# Patient Record
Sex: FEMALE | Race: WHITE
[De-identification: names, ages, dates, MRNs, and addresses within clinical notes are randomized per-mention and may not be internally consistent; named-entity substitution may affect disease eponyms.]

---

## 2020-01-31 ENCOUNTER — HOSPITAL ENCOUNTER (EMERGENCY)
Dept: HOSPITAL 76 - ED | Age: 65
Discharge: ANOTHER HOSPITAL | End: 2020-01-31
Payer: COMMERCIAL

## 2020-01-31 ENCOUNTER — HOSPITAL ENCOUNTER (OUTPATIENT)
Dept: HOSPITAL 76 - EMS | Age: 65
Discharge: ANOTHER HOSPITAL | End: 2020-01-31
Attending: SURGERY
Payer: COMMERCIAL

## 2020-01-31 VITALS — DIASTOLIC BLOOD PRESSURE: 78 MMHG | SYSTOLIC BLOOD PRESSURE: 155 MMHG

## 2020-01-31 DIAGNOSIS — R07.2: ICD-10-CM

## 2020-01-31 DIAGNOSIS — I21.4: Primary | ICD-10-CM

## 2020-01-31 DIAGNOSIS — R07.9: Primary | ICD-10-CM

## 2020-01-31 LAB
ALBUMIN DIAFP-MCNC: 4.2 G/DL (ref 3.2–5.5)
ALBUMIN/GLOB SERPL: 1.1 {RATIO} (ref 1–2.2)
ALP SERPL-CCNC: 74 IU/L (ref 42–121)
ALT SERPL W P-5'-P-CCNC: 22 IU/L (ref 10–60)
ANION GAP SERPL CALCULATED.4IONS-SCNC: 14 MMOL/L (ref 6–13)
AST SERPL W P-5'-P-CCNC: 17 IU/L (ref 10–42)
BASOPHILS NFR BLD AUTO: 0.1 10^3/UL (ref 0–0.1)
BASOPHILS NFR BLD AUTO: 0.7 %
BILIRUB BLD-MCNC: 0.7 MG/DL (ref 0.2–1)
BUN SERPL-MCNC: 16 MG/DL (ref 6–20)
CALCIUM UR-MCNC: 9.1 MG/DL (ref 8.5–10.3)
CHLORIDE SERPL-SCNC: 99 MMOL/L (ref 101–111)
CO2 SERPL-SCNC: 26 MMOL/L (ref 21–32)
CREAT SERPLBLD-SCNC: 0.9 MG/DL (ref 0.4–1)
EOSINOPHIL # BLD AUTO: 0.3 10^3/UL (ref 0–0.7)
EOSINOPHIL NFR BLD AUTO: 2.2 %
ERYTHROCYTE [DISTWIDTH] IN BLOOD BY AUTOMATED COUNT: 15.9 % (ref 12–15)
GFRSERPLBLD MDRD-ARVRAT: 63 ML/MIN/{1.73_M2} (ref 89–?)
GLOBULIN SER-MCNC: 3.7 G/DL (ref 2.1–4.2)
GLUCOSE SERPL-MCNC: 114 MG/DL (ref 70–100)
HGB UR QL STRIP: 14.6 G/DL (ref 12–16)
LIPASE SERPL-CCNC: 30 U/L (ref 22–51)
LYMPHOCYTES # SPEC AUTO: 2 10^3/UL (ref 1.5–3.5)
LYMPHOCYTES NFR BLD AUTO: 16.8 %
MCH RBC QN AUTO: 27.6 PG (ref 27–31)
MCHC RBC AUTO-ENTMCNC: 32 G/DL (ref 32–36)
MCV RBC AUTO: 86.2 FL (ref 81–99)
MONOCYTES # BLD AUTO: 0.6 10^3/UL (ref 0–1)
MONOCYTES NFR BLD AUTO: 5.3 %
NEUTROPHILS # BLD AUTO: 8.9 10^3/UL (ref 1.5–6.6)
NEUTROPHILS # SNV AUTO: 11.9 X10^3/UL (ref 4.8–10.8)
NEUTROPHILS NFR BLD AUTO: 74.4 %
PDW BLD AUTO: 10.1 FL (ref 7.9–10.8)
PLATELET # BLD: 372 10^3/UL (ref 130–450)
PROT SPEC-MCNC: 7.9 G/DL (ref 6.7–8.2)
RBC MAR: 5.29 10^6/UL (ref 4.2–5.4)
SODIUM SERPLBLD-SCNC: 139 MMOL/L (ref 135–145)

## 2020-01-31 PROCEDURE — 99285 EMERGENCY DEPT VISIT HI MDM: CPT

## 2020-01-31 PROCEDURE — 85379 FIBRIN DEGRADATION QUANT: CPT

## 2020-01-31 PROCEDURE — 36415 COLL VENOUS BLD VENIPUNCTURE: CPT

## 2020-01-31 PROCEDURE — 99284 EMERGENCY DEPT VISIT MOD MDM: CPT

## 2020-01-31 PROCEDURE — 93005 ELECTROCARDIOGRAM TRACING: CPT

## 2020-01-31 PROCEDURE — 96375 TX/PRO/DX INJ NEW DRUG ADDON: CPT

## 2020-01-31 PROCEDURE — 83735 ASSAY OF MAGNESIUM: CPT

## 2020-01-31 PROCEDURE — 96374 THER/PROPH/DIAG INJ IV PUSH: CPT

## 2020-01-31 PROCEDURE — 84484 ASSAY OF TROPONIN QUANT: CPT

## 2020-01-31 PROCEDURE — 71045 X-RAY EXAM CHEST 1 VIEW: CPT

## 2020-01-31 PROCEDURE — 83880 ASSAY OF NATRIURETIC PEPTIDE: CPT

## 2020-01-31 PROCEDURE — 85025 COMPLETE CBC W/AUTO DIFF WBC: CPT

## 2020-01-31 PROCEDURE — 83690 ASSAY OF LIPASE: CPT

## 2020-01-31 PROCEDURE — 80053 COMPREHEN METABOLIC PANEL: CPT

## 2020-01-31 NOTE — XRAY REPORT
Reason:  Chest pain

Procedure Date:  01/31/2020   

Accession Number:  [id number] / [id number]                    

Procedure:  XR  - Chest 1 View X-Ray CPT Code:  99206

 

***Final Report***

 

 

FULL RESULT:

 

 

EXAM:

CHEST RADIOGRAPHY 1 VIEW

 

EXAM DATE: 1/31/2020.

 

CLINICAL HISTORY: Chest pain.

 

COMPARISON: None.

 

TECHNIQUE: AP upright portable chest at 1051.

 

FINDINGS:

Lungs/Pleura: Normal vasculature. The lungs are clear. No pleural fluid 

or pneumothorax.

 

Mediastinum: Normal cardiac and mediastinal contours.

 

Bones: Mild degenerative changes of the spine.

IMPRESSION: Normal AP upright portable chest.

 

RADIA

## 2020-01-31 NOTE — ED PHYSICIAN DOCUMENTATION
PD HPI CHEST PAIN





- Stated complaint


Stated Complaint: CP





- Chief complaint


Chief Complaint: Cardiac





- History obtained from


History obtained from: Patient





- History of Present Illness


Timing - onset: How many weeks ago (Onset of chest pain 2 weeks ago which came 

on abruptly at that time and was only with light exertion.  It continued for an 

hour or 2 and then decreased over a few hours.  Since that time it has been 

intermittent with exertion mainly and light activity but the last several days 

has been even at rest.  This morning she had an onset of the pain without really

any exertion and it continued through being seen in the office.  She was given a

nitroglycerin which did relieve the pain.  She had an EKG which shows some 

inverted T waves but no ST changes.  Referred to the hospital for further 

testing.)


Timing - onset during: Rest, Light activity


Timing - details: Intermittant (initially, but more consistent and at rest the 

past couple of days.)


Quality: Aching, Dull, Pain.  No: Pressure


Location: Substernal, Left chest


Radiation: Neck, Back


Improved by: Nitro (given by PMD in office PTA).  No: Rest


Associated symptoms: Shortness of air, Feeling faint / dizzy.  No: Palpitations,

Cough


Similar symptoms before: Has not had sx before





Review of Systems


Constitutional: denies: Fever, Chills


Nose: reports: Rhinorrhea / runny nose (regularly due to allergies).  denies: 

Congestion


Throat: denies: Sore throat


Respiratory: denies: Cough


GI: denies: Abdominal Pain, Nausea, Vomiting, Diarrhea


Skin: denies: Rash, Lesions


Neurologic: reports: Generalized weakness.  denies: Focal weakness, Numbness, 

Near syncope





PD PAST MEDICAL HISTORY





- Past Medical History


Past Medical History: No


Cardiovascular: None


Respiratory: None


Neuro: None


Endocrine/Autoimmune: None


GI: None


GYN: None


: None


HEENT: None


Psych: None


Musculoskeletal: None


Derm: None





- Past Surgical History


Past Surgical History: No





- Allergies


Allergies/Adverse Reactions: 


                                    Allergies











Allergy/AdvReac Type Severity Reaction Status Date / Time


 


No Known Drug Allergies Allergy   Verified 01/31/20 10:41














- Social History


Does the pt smoke?: No


Smoking Status: Never smoker


Does the pt drink ETOH?: No





- Immunizations


Immunizations are current?: Yes





- POLST


Patient has POLST: No





PD ED PE NORMAL





- Vitals


Vital signs reviewed: Yes





- General


General: Alert and oriented X 3, No acute distress, Well developed/nourished





- HEENT


HEENT: Moist mucous membranes, Pharynx benign





- Neck


Neck: Supple, no meningeal sign, No adenopathy, No JVD





- Cardiac


Cardiac: RRR, No murmur





- Respiratory


Respiratory: No respiratory distress, Clear bilaterally





- Abdomen


Abdomen: Soft, Non tender, No organomegaly





- Female 


Female : Deferred





- Rectal


Rectal: Deferred





- Back


Back: No CVA TTP





- Derm


Derm: Normal color, Warm and dry





- Extremities


Extremities: No tenderness to palpate, Normal ROM s pain, No edema, No calf 

tenderness / cord





- Neuro


Neuro: Alert and oriented X 3, No motor deficit, Normal speech





Results





- Vitals


Vitals: 


                               Vital Signs - 24 hr











  01/31/20 01/31/20 01/31/20





  10:39 11:56 12:00


 


Temperature 36.0 C L  


 


Heart Rate 96 97 81


 


Respiratory 16 14 15





Rate   


 


Blood Pressure 162/86 H 152/92 H 153/94 H


 


O2 Saturation 98 98 98














  01/31/20 01/31/20 01/31/20





  12:05 12:10 12:31


 


Temperature   


 


Heart Rate 77 76 77


 


Respiratory 20 22 16





Rate   


 


Blood Pressure 141/92 H 149/86 H 147/84 H


 


O2 Saturation 96 97 95














  01/31/20 01/31/20





  14:00 14:56


 


Temperature 36.9 C 


 


Heart Rate 90 85


 


Respiratory 19 24





Rate  


 


Blood Pressure 157/87 H 166/91 H


 


O2 Saturation 97 98








                                     Oxygen











O2 Source                      Room air

















- EKG (time done)


  ** 10:41


Rate: Rate (enter#) (94)


Rhythm: NSR


Axis: Normal


Intervals: Normal UT


QRS: Normal


Ischemia: Normal ST segments, T wave inversion (anterolaterally).  No: ST 

elevation c/w ischemia, ST depression





- Labs


Labs: 


                                Laboratory Tests











  01/31/20 01/31/20 01/31/20





  10:55 10:55 10:55


 


WBC  11.9 H  


 


RBC  5.29  


 


Hgb  14.6  


 


Hct  45.6  


 


MCV  86.2  


 


MCH  27.6  


 


MCHC  32.0  


 


RDW  15.9 H  


 


Plt Count  372  


 


MPV  10.1  


 


Neut # (Auto)  8.9 H  


 


Lymph # (Auto)  2.0  


 


Mono # (Auto)  0.6  


 


Eos # (Auto)  0.3  


 


Baso # (Auto)  0.1  


 


Absolute Nucleated RBC  0.00  


 


Nucleated RBC %  0.0  


 


D-Dimer   


 


Sodium   139 


 


Potassium   3.7 


 


Chloride   99 L 


 


Carbon Dioxide   26 


 


Anion Gap   14.0 H 


 


BUN   16 


 


Creatinine   0.9 


 


Estimated GFR (MDRD)   63 L 


 


Glucose   114 H 


 


Calcium   9.1 


 


Magnesium   


 


Total Bilirubin   0.7 


 


AST   17 


 


ALT   22 


 


Alkaline Phosphatase   74 


 


Troponin I High Sens    111.1 H*


 


B-Natriuretic Peptide   


 


Total Protein   7.9 


 


Albumin   4.2 


 


Globulin   3.7 


 


Albumin/Globulin Ratio   1.1 


 


Lipase   30 














  01/31/20 01/31/20 01/31/20





  12:02 12:02 12:02


 


WBC   


 


RBC   


 


Hgb   


 


Hct   


 


MCV   


 


MCH   


 


MCHC   


 


RDW   


 


Plt Count   


 


MPV   


 


Neut # (Auto)   


 


Lymph # (Auto)   


 


Mono # (Auto)   


 


Eos # (Auto)   


 


Baso # (Auto)   


 


Absolute Nucleated RBC   


 


Nucleated RBC %   


 


D-Dimer  < 200.0 L  


 


Sodium   


 


Potassium   


 


Chloride   


 


Carbon Dioxide   


 


Anion Gap   


 


BUN   


 


Creatinine   


 


Estimated GFR (MDRD)   


 


Glucose   


 


Calcium   


 


Magnesium    2.1


 


Total Bilirubin   


 


AST   


 


ALT   


 


Alkaline Phosphatase   


 


Troponin I High Sens   


 


B-Natriuretic Peptide   120 H 


 


Total Protein   


 


Albumin   


 


Globulin   


 


Albumin/Globulin Ratio   


 


Lipase   














  01/31/20





  12:58


 


WBC 


 


RBC 


 


Hgb 


 


Hct 


 


MCV 


 


MCH 


 


MCHC 


 


RDW 


 


Plt Count 


 


MPV 


 


Neut # (Auto) 


 


Lymph # (Auto) 


 


Mono # (Auto) 


 


Eos # (Auto) 


 


Baso # (Auto) 


 


Absolute Nucleated RBC 


 


Nucleated RBC % 


 


D-Dimer 


 


Sodium 


 


Potassium 


 


Chloride 


 


Carbon Dioxide 


 


Anion Gap 


 


BUN 


 


Creatinine 


 


Estimated GFR (MDRD) 


 


Glucose 


 


Calcium 


 


Magnesium 


 


Total Bilirubin 


 


AST 


 


ALT 


 


Alkaline Phosphatase 


 


Troponin I High Sens  121.0 H*


 


B-Natriuretic Peptide 


 


Total Protein 


 


Albumin 


 


Globulin 


 


Albumin/Globulin Ratio 


 


Lipase 














PD MEDICAL DECISION MAKING





- ED course


Complexity details: reviewed results (EKG with some T wave inversions but no ST 

elevations.  Her troponin is moderately elevated suggesting myocardial injury.  

A repeat at 2 hours showed a very slight increase from 1 11-1 21.  Her d-dimer 

was negative excluding PE.  Her BNP was not elevated.  At this point would be 

concern for unstable angina versus non-STEMI and she needs a higher degree of 

heart testing then we would provide here at our facility.  I talked with 

[facility] cardiology initially and then the hospitalist.  They

are accepting of transfer.), re-evaluated patient, considered differential 

(Concern for MI versus unstable angina.  Given the onset couple of weeks ago 

quickly at that point, could also consider PE or lung related.  Will check lab 

tests as well as EKG.), d/w patient





Departure





- Departure


Disposition: 02 Transfer Acute Care Hosp


Clinical Impression: 


 Elevated troponin, Non-STEMI (non-ST elevated myocardial infarction)





Chest pain


Qualifiers:


 Chest pain type: precordial pain Qualified Code(s): R07.2 - Precordial pain





Condition: Stable


Record reviewed to determine appropriate education?: Yes

## 2020-02-11 ENCOUNTER — HOSPITAL ENCOUNTER (OUTPATIENT)
Dept: HOSPITAL 76 - LAB.S | Age: 65
Discharge: HOME | End: 2020-02-11
Attending: NURSE PRACTITIONER
Payer: COMMERCIAL

## 2020-02-11 DIAGNOSIS — I25.2: Primary | ICD-10-CM

## 2020-02-11 LAB
ANION GAP SERPL CALCULATED.4IONS-SCNC: 8 MMOL/L (ref 6–13)
BUN SERPL-MCNC: 15 MG/DL (ref 6–20)
CALCIUM UR-MCNC: 8.8 MG/DL (ref 8.5–10.3)
CHLORIDE SERPL-SCNC: 101 MMOL/L (ref 101–111)
CO2 SERPL-SCNC: 29 MMOL/L (ref 21–32)
CREAT SERPLBLD-SCNC: 1 MG/DL (ref 0.4–1)
GFRSERPLBLD MDRD-ARVRAT: 56 ML/MIN/{1.73_M2} (ref 89–?)
GLUCOSE SERPL-MCNC: 102 MG/DL (ref 70–100)
SODIUM SERPLBLD-SCNC: 138 MMOL/L (ref 135–145)

## 2020-02-11 PROCEDURE — 36415 COLL VENOUS BLD VENIPUNCTURE: CPT

## 2020-02-11 PROCEDURE — 80048 BASIC METABOLIC PNL TOTAL CA: CPT

## 2022-02-16 ENCOUNTER — HOSPITAL ENCOUNTER (OUTPATIENT)
Dept: HOSPITAL 76 - LAB.S | Age: 67
Discharge: HOME | End: 2022-02-16
Payer: COMMERCIAL

## 2022-02-16 DIAGNOSIS — I10: Primary | ICD-10-CM

## 2022-02-16 DIAGNOSIS — E78.5: ICD-10-CM

## 2022-02-16 LAB
ALBUMIN DIAFP-MCNC: 3.7 G/DL (ref 3.2–5.5)
ALBUMIN/GLOB SERPL: 1.1 {RATIO} (ref 1–2.2)
ALP SERPL-CCNC: 95 IU/L (ref 42–121)
ALT SERPL W P-5'-P-CCNC: 29 IU/L (ref 10–60)
ANION GAP SERPL CALCULATED.4IONS-SCNC: 9 MMOL/L (ref 6–13)
AST SERPL W P-5'-P-CCNC: 18 IU/L (ref 10–42)
BASOPHILS NFR BLD AUTO: 0.1 10^3/UL (ref 0–0.1)
BASOPHILS NFR BLD AUTO: 0.6 %
BILIRUB BLD-MCNC: 0.7 MG/DL (ref 0.2–1)
BUN SERPL-MCNC: 21 MG/DL (ref 6–20)
CALCIUM UR-MCNC: 9.4 MG/DL (ref 8.5–10.3)
CHLORIDE SERPL-SCNC: 104 MMOL/L (ref 101–111)
CHOLEST SERPL-MCNC: 100 MG/DL
CO2 SERPL-SCNC: 27 MMOL/L (ref 21–32)
CREAT SERPLBLD-SCNC: 0.8 MG/DL (ref 0.4–1)
EOSINOPHIL # BLD AUTO: 0.4 10^3/UL (ref 0–0.7)
EOSINOPHIL NFR BLD AUTO: 4.3 %
ERYTHROCYTE [DISTWIDTH] IN BLOOD BY AUTOMATED COUNT: 14.3 % (ref 12–15)
GFRSERPLBLD MDRD-ARVRAT: 72 ML/MIN/{1.73_M2} (ref 89–?)
GLOBULIN SER-MCNC: 3.3 G/DL (ref 2.1–4.2)
GLUCOSE SERPL-MCNC: 99 MG/DL (ref 70–100)
HCT VFR BLD AUTO: 42.8 % (ref 37–47)
HDLC SERPL-MCNC: 43 MG/DL
HDLC SERPL: 2.3 {RATIO} (ref ?–4.4)
HGB UR QL STRIP: 13.6 G/DL (ref 12–16)
LDLC SERPL CALC-MCNC: 44 MG/DL
LDLC SERPL-MCNC: 42 MG/DL
LDLC/HDLC SERPL: 1 {RATIO} (ref ?–4.4)
LYMPHOCYTES # SPEC AUTO: 2.5 10^3/UL (ref 1.5–3.5)
LYMPHOCYTES NFR BLD AUTO: 26.9 %
MCH RBC QN AUTO: 28.8 PG (ref 27–31)
MCHC RBC AUTO-ENTMCNC: 31.8 G/DL (ref 32–36)
MCV RBC AUTO: 90.7 FL (ref 81–99)
MONOCYTES # BLD AUTO: 0.8 10^3/UL (ref 0–1)
MONOCYTES NFR BLD AUTO: 8.5 %
NEUTROPHILS # BLD AUTO: 5.6 10^3/UL (ref 1.5–6.6)
NEUTROPHILS # SNV AUTO: 9.4 X10^3/UL (ref 4.8–10.8)
NEUTROPHILS NFR BLD AUTO: 59.4 %
NRBC # BLD AUTO: 0 /100WBC
NRBC # BLD AUTO: 0 X10^3/UL
PDW BLD AUTO: 10.3 FL (ref 7.9–10.8)
PLATELET # BLD: 349 10^3/UL (ref 130–450)
POTASSIUM SERPL-SCNC: 4 MMOL/L (ref 3.5–5)
PROT SPEC-MCNC: 7 G/DL (ref 6.7–8.2)
RBC MAR: 4.72 10^6/UL (ref 4.2–5.4)
SODIUM SERPLBLD-SCNC: 140 MMOL/L (ref 135–145)
TRIGL P FAST SERPL-MCNC: 63 MG/DL
VLDLC SERPL-SCNC: 13 MG/DL

## 2022-02-16 PROCEDURE — 80061 LIPID PANEL: CPT

## 2022-02-16 PROCEDURE — 85025 COMPLETE CBC W/AUTO DIFF WBC: CPT

## 2022-02-16 PROCEDURE — 83721 ASSAY OF BLOOD LIPOPROTEIN: CPT

## 2022-02-16 PROCEDURE — 80053 COMPREHEN METABOLIC PANEL: CPT

## 2022-02-16 PROCEDURE — 36415 COLL VENOUS BLD VENIPUNCTURE: CPT
